# Patient Record
Sex: MALE | Race: WHITE
[De-identification: names, ages, dates, MRNs, and addresses within clinical notes are randomized per-mention and may not be internally consistent; named-entity substitution may affect disease eponyms.]

---

## 2018-06-18 ENCOUNTER — HOSPITAL ENCOUNTER (EMERGENCY)
Dept: HOSPITAL 33 - ED | Age: 68
Discharge: HOME | End: 2018-06-18
Payer: MEDICARE

## 2018-06-18 VITALS — HEART RATE: 70 BPM | SYSTOLIC BLOOD PRESSURE: 160 MMHG | DIASTOLIC BLOOD PRESSURE: 90 MMHG

## 2018-06-18 VITALS — OXYGEN SATURATION: 97 %
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
## 2018-06-18 DIAGNOSIS — M79.631: ICD-10-CM

## 2018-06-18 DIAGNOSIS — S63.501A: Primary | ICD-10-CM

## 2018-06-18 DIAGNOSIS — M79.641: ICD-10-CM

## 2018-06-18 DIAGNOSIS — M25.531: ICD-10-CM

## 2018-06-18 PROCEDURE — 73130 X-RAY EXAM OF HAND: CPT

## 2018-06-18 PROCEDURE — 73090 X-RAY EXAM OF FOREARM: CPT

## 2018-06-18 PROCEDURE — 73110 X-RAY EXAM OF WRIST: CPT

## 2018-06-18 PROCEDURE — 99283 EMERGENCY DEPT VISIT LOW MDM: CPT

## 2018-06-18 NOTE — XRAY
Indication: Pain following fall 4 days ago.



Comparison: None



2 views of the right forearm demonstrates old distal radial/ulnar fractures

with intact hardware and advanced radiocarpal degenerative changes similar

appearance to right hand exam December 19, 2011.  No other bony, articular, or

soft tissue abnormalities.

## 2018-06-18 NOTE — ERPHSYRPT
- History of Present Illness


Time Seen by Provider: 06/18/18 09:24


Source: patient


Exam Limitations: no limitations


Physician History: 





The patient is a 67-year-old right-handed male complaining of falling on 

Thursday or Friday (3-4 days ago), hurting his right wrist and forearm.  The 

wrist and hand are still swollen.  He has had surgery on his right forearm with 

"plates put in".  He denies numbness or tingling.  His past medical history is 

significant for right forearm open reduction internal fixation, CABG, coronary 

artery disease.


Occurred: days ago (4)


Reason for Fall: lost balance, fell from standing pos


Injuries/Pain Location: upper extremity (right forearm, wrist, and hand)


Loss of Consciousness: no loss of consciousness


Quality: aching


Severity of Pain-Max: moderate


Severity of Pain-Current: moderate


Modifying Factors: Improves With: nothing


Associated Symptoms (Fall): extremity injury


Allergies/Adverse Reactions: 








codeine Adverse Reaction (Intermediate, Verified 06/18/18 09:19)


 Vomiting





Home Medications: 








Hydrocodone/APAP 5/325*** [Norco 5/325 mg***]  10/13/14 [History]








- Review of Systems


Constitutional: No Fever, No Chills


Eyes: No Symptoms


Ears, Nose, & Throat: No Symptoms


Respiratory: No Cough, No Dyspnea


Cardiac: No Chest Pain, No Edema, No Syncope


Abdominal/Gastrointestinal: No Abdominal Pain, No Nausea, No Vomiting, No 

Diarrhea


Genitourinary Symptoms: No Dysuria


Musculoskeletal: Fall, Injury, Joint Swelling


Skin: No Rash


Neurological: No Dizziness, No Focal Weakness, No Sensory Changes


Psychological: No Symptoms


Endocrine: No Symptoms


Hematologic/Lymphatic: No Symptoms


Immunological/Allergic: No Symptoms


All Other Systems: Reviewed and Negative





- Past Medical History


Pertinent Past Medical History: Yes


Neurological History: Stroke


ENT History: No Pertinent History


Cardiac History: High Cholesterol, Hypertension, Myocardial Infarction (MI)


Respiratory History: No Pertinent History


Endocrine Medical History: No Pertinent History


Musculoskeletal History: No Pertinent History


GI Medical History: No Pertinent History


 History: No Pertinent History


Psycho-Social History: No Pertinent History


Male Reproductive Disorders: No Pertinent History





- Past Surgical History


Past Surgical History: Yes


Neuro Surgical History: No Pertinent History


Cardiac: CABG


Respiratory: No Pertinent History


Gastrointestinal: Appendectomy


Genitourinary: No Pertinent History


Musculoskeletal: Orthopedic Surgery, Other


Male Surgical History: No Pertinent History





- Social History


Smoking Status: Current every day smoker


How long have you smoked: 40


Exposure to second hand smoke: Yes


Drug Use: none





- Nursing Vital Signs


Nursing Vital Signs: 


 Initial Vital Signs











Temperature  98.6 F   06/18/18 09:08


 


Pulse Rate  68   06/18/18 09:08


 


Respiratory Rate  20   06/18/18 09:08


 


Blood Pressure  176/101   06/18/18 09:08


 


O2 Sat by Pulse Oximetry  97   06/18/18 09:08








 Pain Scale











Pain Intensity                 10

















- Carson Coma Score


Best Eye Response (Carson): (4) open spontaneously


Best Verbal Response (Carson): (5) oriented


Best Motor Response (Swathi): (6) obeys commands


Carson Total: 15





- Physical Exam


General Appearance: no apparent distress, alert


Head Injury: no evidence of injury


Eye Exam: PERRL/EOMI


ENT Exam: airway nml


Neck Exam: normal inspection, No tenderness


Respiratory/Chest Exam: normal breath sounds, No chest tenderness, No 

respiratory distress


Cardiovascular Exam: normal heart sounds, regular rate/rhythm


Gastrointestinal Exam: soft, No tenderness, No distention, No guarding, No 

ecchymosis


Rectal Exam: not done


Back Exam: normal inspection, No vertebral tenderness


Extremity Exam: limited range of motion, pain with movement, swelling, 

tenderness, other (Examination of the right forearm, wrist, and hand shows a 

well healed scar from the open reduction internal fixation of the right 

forearm.  There is limited range of movement of the forearm with supination and 

pronation.  There is moderate swelling of the right wrist and right hand.)


Neurologic Exam: alert, oriented x 3, cooperative, sensation nml, No motor 

deficits


Skin Exam: normal color, warm, dry


**SpO2 Interpretation**: normal


Oxygen Delivery: Room Air





- Radiology Exams


  ** Right Forearm


X-ray Interpretation: Reviewed by me, Teleradiologist Report, No Fracture (per 

Dr Kemp)





  ** Right Wrist


X-ray Interpretation: Reviewed by me, Teleradiologist Report, No Fracture (per 

Dr Kemp.)





  ** Right Hand


X-ray Interpretation: Reviewed by me, Teleradiologist Report, No Fracture (Per 

Dr Kemp.)


Ordered Tests: 


 Active Orders 24 hr











 Category Date Time Status


 


 FOREARM Stat Exams  06/18/18 09:23 Completed


 


 HAND (MINIMUM 3 VIEWS) Stat Exams  06/18/18 09:27 Completed


 


 WRIST (MIN 3 VIEWS) Stat Exams  06/18/18 09:23 Completed














- Progress


Progress: unchanged


Counseled pt/family regarding: need for follow-up, rad results





- Departure


Time of Disposition: 10:21


Departure Disposition: Home


Clinical Impression: 


 Right wrist sprain





Condition: Stable


Critical Care Time: No


Referrals: 


TRIPP ALVARADO [Primary Care Provider] - 


Additional Instructions: 


You have a sprain of your right wrist.  The x-rays of your forearm, wrist, and 

hand were negative broken bones.  Your hardware fixing your forearm from past 

operations is normal in appearance.  Wear the wrist splint to provide comfort.  

Keep your wrist and hand elevated.  Apply ice as needed.  Take Norco one tablet 

every 4-6 hours as needed.  If there is no improvement in 1-2 days, please see 

Dr. Alvarado or your orthopedic surgeon.


Prescriptions: 


Hydrocodone/APAP 5/325*** [Norco 5/325 mg] 1 each PO Q4-6HPRN PRN #10 tablet 

MDD 6


 PRN Reason: Pain

## 2018-06-18 NOTE — XRAY
Indication: Pain following fall 4 days ago.



Comparison: None



3 views of the right wrist demonstrates old distal radial/ulnar fractures with

intact hardware and advanced radiocarpal degenerative changes similar

appearance to right hand exam December 19, 2011.  No other bony, articular, or

soft tissue abnormalities.

## 2018-06-18 NOTE — XRAY
Indication: Pain following fall 4 days ago.



Comparison: December 19, 2011.



3 views of the right hand again demonstrates mild degenerative changes of all

IP joints and 1st metacarpal multangular articulation.  No new/acute bony,

articular, or soft tissue abnormalities.  Right wrist and right forearm

reported separately.

## 2024-10-02 ENCOUNTER — HOSPITAL ENCOUNTER (EMERGENCY)
Dept: HOSPITAL 33 - ED | Age: 74
Discharge: HOME | End: 2024-10-02
Payer: MEDICARE

## 2024-10-02 VITALS — DIASTOLIC BLOOD PRESSURE: 87 MMHG | SYSTOLIC BLOOD PRESSURE: 171 MMHG | HEART RATE: 78 BPM

## 2024-10-02 VITALS — TEMPERATURE: 98.5 F

## 2024-10-02 VITALS — RESPIRATION RATE: 20 BRPM | OXYGEN SATURATION: 95 %

## 2024-10-02 DIAGNOSIS — Z79.899: ICD-10-CM

## 2024-10-02 DIAGNOSIS — I10: Primary | ICD-10-CM

## 2024-10-02 DIAGNOSIS — E78.5: ICD-10-CM

## 2024-10-02 DIAGNOSIS — Z72.0: ICD-10-CM

## 2024-10-02 LAB
ALBUMIN SERPL-MCNC: 4.1 G/DL (ref 3.5–5)
ALP SERPL-CCNC: 80 U/L (ref 38–126)
ALT SERPL-CCNC: 27 U/L (ref 0–50)
ANION GAP SERPL CALC-SCNC: 8.2 MEQ/L (ref 5–15)
AST SERPL QL: 24 U/L (ref 17–59)
BASOPHILS # BLD AUTO: 0.04 X10^3/UL (ref 0.01–0.08)
BASOPHILS NFR BLD AUTO: 0.5 % (ref 0.2–1.2)
BILIRUB BLD-MCNC: 0.4 MG/DL (ref 0.2–1.3)
BUN SERPL-MCNC: 13 MG/DL (ref 9–20)
CALCIUM SPEC-MCNC: 9.4 MG/DL (ref 8.4–10.2)
CHLORIDE SERPL-SCNC: 107 MMOL/L (ref 98–107)
CO2 SERPL-SCNC: 30 MMOL/L (ref 22–30)
CREAT SERPL-MCNC: 0.95 MG/DL (ref 0.66–1.25)
EOSINOPHIL # BLD AUTO: 0.21 X10^3/UL (ref 0.04–0.54)
GFR SERPLBLD BASED ON 1.73 SQ M-ARVRAT: 85 ML/MIN
GLUCOSE SERPL-MCNC: 113 MG/DL (ref 74–106)
HCT VFR BLD AUTO: 44 % (ref 40.1–51)
HGB BLD-MCNC: 14.7 G/DL (ref 13.7–17.5)
IMM GRANULOCYTES # BLD: 0.05 X10^3U/L (ref 0–0.03)
IMM GRANULOCYTES NFR BLD: 0.7 % (ref 0–0.43)
LYMPHOCYTES # SPEC AUTO: 1.53 X10^3/UL (ref 1.32–3.57)
MAGNESIUM SERPL-MCNC: 2 MG/DL (ref 1.6–2.3)
MCH RBC QN AUTO: 30 PG (ref 25.7–32.2)
MCHC RBC AUTO-ENTMCNC: 33.4 G/DL (ref 32.3–36.5)
MONOCYTES # BLD AUTO: 0.54 X10^3/UL (ref 0.3–0.82)
NRBC # BLD AUTO: 0 X10^3U/L (ref 0–0.01)
NRBC BLD AUTO-RTO: 0 % (ref 0–0.2)
PLATELET # BLD AUTO: 225 X10^3/UL (ref 163–337)
POTASSIUM SERPLBLD-SCNC: 4.5 MMOL/L (ref 3.5–5.1)
PROT SERPL-MCNC: 6.6 G/DL (ref 6.3–8.2)
RBC # BLD AUTO: 4.9 X10^6/UL (ref 4.63–6.08)
SODIUM SERPL-SCNC: 140 MMOL/L (ref 135–145)
WBC # BLD AUTO: 7.7 X10^3/UL (ref 4.23–9.07)

## 2024-10-02 PROCEDURE — 94760 N-INVAS EAR/PLS OXIMETRY 1: CPT

## 2024-10-02 PROCEDURE — 93005 ELECTROCARDIOGRAM TRACING: CPT

## 2024-10-02 PROCEDURE — 84484 ASSAY OF TROPONIN QUANT: CPT

## 2024-10-02 PROCEDURE — 99284 EMERGENCY DEPT VISIT MOD MDM: CPT

## 2024-10-02 PROCEDURE — 36415 COLL VENOUS BLD VENIPUNCTURE: CPT

## 2024-10-02 PROCEDURE — 93041 RHYTHM ECG TRACING: CPT

## 2024-10-02 PROCEDURE — 80053 COMPREHEN METABOLIC PANEL: CPT

## 2024-10-02 PROCEDURE — 85025 COMPLETE CBC W/AUTO DIFF WBC: CPT

## 2024-10-02 PROCEDURE — 83735 ASSAY OF MAGNESIUM: CPT

## 2024-10-02 NOTE — ERPHSYRPT
- History of Present Illness


Time Seen by Provider: 10/02/24 14:54


Source: patient, family


Exam Limitations: no limitations


Physician History: 





This is a 73-year-old white male patient who does have a history of hypertension

but did not take his blood pressure medication this morning and was brought to 

the emergency department by private vehicle escorted by his sister in order for 

him to obtain a radiographic study that was ordered for him.  Patient's primary 

care provider is nurse practitioner Codey.  He also does see cardiologist, Dr. Hernández.  It is standard for radiologic services to obtain vital signs before 

procedures or studies being performed.  He had very elevated systolic blood 

pressure in the 200 range.  Upon arrival to the emergency department the patient

systolic blood pressure was 221.  He was very anxious.  The nurse followed up 

with the patient's pharmacy.  The patient has not picked up any blood pressure 

medication since February 2024.  Patient denies headache.  Patient denies chest 

pain.  Patient denies visual changes.  Patient has a history of gout, 

hypertension, hyperlipidemia, CVA, coronary artery disease (CABG)


Timing/Duration: today


Severity: moderate (The value of the systolic blood pressure is elevated.  

Patient is asymptomatic)


Associated Symptoms: denies symptoms


Allergies/Adverse Reactions: 








codeine Adverse Reaction (Intermediate, Verified 10/02/24 15:13)


   Vomiting





Home Medications: 








Rosuvastatin Calcium 10 mg PO DAILY 10/02/24 [History]





Hx Tetanus, Diphtheria Vaccination/Date Given: No


Hx Influenza Vaccination/Date Given: No


Hx Pneumococcal Vaccination/Date Given: No





Travel Risk





- International Travel


Have you traveled outside of the country in past 3 weeks: No





- Emerging Infectious Disease


Are you exhibiting symptoms associated with any current EIDs: No





- Review of Systems


Constitutional: No Symptoms


Eyes: No Symptoms


Ears, Nose, & Throat: No Symptoms


Respiratory: No Symptoms


Cardiac: No Symptoms


Abdominal/Gastrointestinal: No Symptoms


Genitourinary Symptoms: No Symptoms


Musculoskeletal: No Symptoms


Skin: No Symptoms


Neurological: No Symptoms


Psychological: No Symptoms


Endocrine: No Symptoms


Hematologic/Lymphatic: No Symptoms


Immunological/Allergic: No Symptoms


All Other Systems: Reviewed and Negative





- Past Medical History


Pertinent Past Medical History: Yes


Neurological History: Stroke


ENT History: No Pertinent History


Cardiac History: High Cholesterol, Hypertension, Myocardial Infarction (MI)


Respiratory History: No Pertinent History


Endocrine Medical History: No Pertinent History


Musculoskeletal History: No Pertinent History


GI Medical History: No Pertinent History


 History: No Pertinent History


Psycho-Social History: No Pertinent History


Male Reproductive Disorders: No Pertinent History





- Past Surgical History


Past Surgical History: Yes


Neuro Surgical History: No Pertinent History


Cardiac: CABG


Respiratory: No Pertinent History


Gastrointestinal: Appendectomy


Genitourinary: No Pertinent History


Musculoskeletal: Orthopedic Surgery, Other


Male Surgical History: No Pertinent History





- Social History


Smoking Status: Current every day smoker


How long have you smoked: 40


Exposure to second hand smoke: Yes


Drug Use: none


Patient Lives Alone: No





- Nursing Vital Signs


Nursing Vital Signs: 


                               Initial Vital Signs











Temperature  98.5 F   10/02/24 15:01


 


Pulse Rate  83   10/02/24 15:01


 


Blood Pressure  221/108   10/02/24 15:01


 


O2 Sat by Pulse Oximetry  98   10/02/24 15:01








                                   Pain Scale











Pain Intensity                 0

















- Physical Exam


General Appearance: no apparent distress, alert


Eye Exam: PERRL/EOMI, eyes nml inspection


Ears, Nose, Throat Exam: normal ENT inspection, moist mucous membranes


Neck Exam: normal inspection, non-tender, supple, full range of motion


Respiratory Exam: normal breath sounds, lungs clear, airway intact, No chest 

tenderness, No respiratory distress


Cardiovascular Exam: regular rate/rhythm, normal heart sounds, normal peripheral

 pulses


Gastrointestinal/Abdomen Exam: soft, normal bowel sounds, No tenderness


Rectal Exam: not done


Back Exam: normal inspection, normal range of motion, No CVA tenderness, No 

vertebral tenderness


Extremity Exam: normal inspection, normal range of motion, pelvis stable


Neurologic Exam: alert, oriented x 3, cooperative, CNs II-XII nml as tested, 

normal mood/affect, nml cerebellar function, nml station & gait, sensation nml


Skin Exam: normal color, warm, dry


Lymphatic Exam: No adenopathy


**SpO2 Interpretation**: normal


O2 Delivery: Room Air





- Course


Nursing assessment & vital signs reviewed: Yes


EKG Interpreted by Me: RATE (84), Sinus Rhythm, NORMAL AXIS, NORMAL INTERVALS, 

NORMAL QRS, Non-specific ST Changes, Other (No acute ischemic changes on today's

 twelve-lead EKG.  The QTc is 466)


Ordered Tests: 


                               Active Orders 24 hr











 Category Date Time Status


 


 Cardiac Monitor STAT Care  10/02/24 15:20 Active


 


 EKG-ER Only STAT Care  10/02/24 15:19 Active


 


 IV Insertion STAT Care  10/02/24 15:19 Active


 


 Pulse Oximetry (ED) STAT Care  10/02/24 15:19 Active


 


 CBC W DIFF Stat Lab  10/02/24 16:15 Completed


 


 CMP Stat Lab  10/02/24 16:15 Completed


 


 MAGNESIUM Stat Lab  10/02/24 16:15 Completed


 


 TROPONIN Q4H Lab  10/02/24 16:15 Completed


 


 TROPONIN Q4H Lab  10/02/24 19:30 Ordered


 


 TROPONIN Q4H Lab  10/02/24 23:30 Ordered











Lab/Rad Data: 


                           Laboratory Result Diagrams





                                 10/02/24 16:15 





                                 10/02/24 16:15 





                               Laboratory Results











  10/02/24 10/02/24 10/02/24 Range/Units





  16:15 16:15 16:15 


 


WBC    7.7  (4.23-9.07)  x10^3/uL


 


RBC    4.90  (4.63-6.08)  x10^6/uL


 


Hgb    14.7  (13.7-17.5)  g/dL


 


Hct    44.0  (40.1-51.0)  %


 


MCV    89.8  (79.0-92.2)  fL


 


MCH    30.0  (25.7-32.2)  pg


 


MCHC    33.4  (32.3-36.5)  g/dL


 


RDW    12.2  (11.6-14.4)  %


 


Plt Count    225  (163-337)  x10^3/uL


 


MPV    8.6 L  (9.4-12.4)  fL


 


Gran %    69.0 H  (34.0-67.9)  %


 


Immature Gran % (Auto)    0.7 H  (0.001-0.429)  %


 


Nucleat RBC Rel Count    0.0  (0.00-0.2)  %


 


Eos # (Auto)    0.21  (0.04-0.54)  x10^3/uL


 


Immature Gran # (Auto)    0.05 H  (0.001-0.031)  x10^3u/L


 


Absolute Lymphs (auto)    1.53  (1.32-3.57)  x10^3/uL


 


Absolute Monos (auto)    0.54  (0.30-0.82)  x10^3/uL


 


Absolute Nucleated RBC    0.00  (0.00-0.012)  x10^3u/L


 


Lymphocytes %    20.0 L  (21.8-53.1)  %


 


Monocytes %    7.1  (5.3-12.2)  %


 


Eosinophils %    2.7  (0.8-7.0)  %


 


Basophils %    0.5  (0.2-1.2)  %


 


Absolute Granulocytes    5.28  (1.78-5.38)  x10^3/uL


 


Basophils #    0.04  (0.01-0.08)  x10^3/uL


 


Sodium   140   (135-145)  mmol/L


 


Potassium   4.5   (3.5-5.1)  mmol/L


 


Chloride   107   ()  mmol/L


 


Carbon Dioxide   30   (22-30)  mmol/L


 


Anion Gap   8.2   (5-15)  MEQ/L


 


BUN   13   (9-20)  mg/dL


 


Creatinine   0.95   (0.66-1.25)  mg/dL


 


Estimated GFR   85.0   ML/MIN


 


Glucose   113 H   ()  mg/dL


 


Calcium   9.4   (8.4-10.2)  mg/dL


 


Magnesium   2.0   (1.6-2.3)  mg/dL


 


Total Bilirubin   0.40   (0.2-1.3)  mg/dL


 


AST   24   (17-59)  U/L


 


ALT   27   (0-50)  U/L


 


Alkaline Phosphatase   80   ()  U/L


 


Troponin I  0.013    (0.000-0.033)  ng/mL


 


Serum Total Protein   6.6   (6.3-8.2)  g/dL


 


Albumin   4.1   (3.5-5.0)  g/dL














- Progress


Progress: unchanged


Progress Note: 





10/02/24 15:52


My medical decision making and the assignment of moderate complexity to this 

patient's medical issue today is based on review of the patient's past medical h

istory, review of the patient's medication list, reviewed patient drug allergy 

list, history present illness and physical findings on examination.  The workup 

includes placement of intravenous line, twelve-lead EKG, troponin level, CBC, 

CMP levels.  We will also order serial blood pressure levels every 10 minutes.





Differential diagnosis includes but not limited to asymptomatic hypertension, 

noncompliance with blood pressure medications


10/02/24 16:55


I interpreted the laboratory data results.  Based on the laboratory data 

results, the patient does not have any acute, emergent medical findings.





His systolic blood pressure dropped to the value of 160 mmHg without any 

intervention.  Patient will be discharged to home and instructed to call his 

primary care provider tomorrow, 10/3/2024 to discuss management of his chronic 

high blood pressure.  Patient continues to be asymptomatic


10/02/24 16:56





Counseled pt/family regarding: lab results, diagnosis, need for follow-up





Medical Desision Making





- Independent Historian


Additional History obtained from: Family





- Diagnostic Testing


Diagnostic test were ordered, analyzed, and reviewed by me: Yes





- Risk of complications


Low Risk: Low risk of morbidity from additional dx testing or treatment





- Departure


Departure Disposition: Home


Clinical Impression: 


 Hypertension





Condition: Stable


Critical Care Time: No


Referrals: 


BETO HERNÁNDEZ [Primary Care Provider] - Follow up/PCP as directed


Additional Instructions: 


Call your primary prescribing provider tomorrow, 10/3/2024 to make arrangements 

for follow-up appointment to be seen in the next 3 days and for further 

instructions regarding your high blood pressure medication

## 2025-03-10 ENCOUNTER — HOSPITAL ENCOUNTER (EMERGENCY)
Dept: HOSPITAL 33 - ED | Age: 75
Discharge: HOME | End: 2025-03-10
Payer: COMMERCIAL

## 2025-03-10 VITALS
OXYGEN SATURATION: 98 % | SYSTOLIC BLOOD PRESSURE: 188 MMHG | RESPIRATION RATE: 20 BRPM | DIASTOLIC BLOOD PRESSURE: 70 MMHG | HEART RATE: 68 BPM

## 2025-03-10 VITALS — TEMPERATURE: 98.4 F

## 2025-03-10 DIAGNOSIS — Z79.891: ICD-10-CM

## 2025-03-10 DIAGNOSIS — Z79.899: ICD-10-CM

## 2025-03-10 DIAGNOSIS — Z72.0: ICD-10-CM
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
## 2025-03-10 DIAGNOSIS — S50.01XA: Primary | ICD-10-CM

## 2025-03-10 DIAGNOSIS — E78.5: ICD-10-CM

## 2025-03-10 DIAGNOSIS — I10: ICD-10-CM

## 2025-03-10 PROCEDURE — 73080 X-RAY EXAM OF ELBOW: CPT

## 2025-03-10 PROCEDURE — 99283 EMERGENCY DEPT VISIT LOW MDM: CPT

## 2025-03-10 NOTE — ERPHSYRPT
- History of Present Illness


Time Seen by Provider: 03/10/25 12:23


Source: patient, family


Exam Limitations: no limitations


Physician History: 





This is a 74-year-old white male patient who presents to the emergency 

department by private vehicle accompanied by his spouse with right elbow pain.  

Patient fell 2 days ago onto his right elbow.  Patient has a history of gout and

hyperlipidemia.  Although the patient is allergic to codeine, he has taken 

Norco's and Percocets without difficulty or adverse effects per his report


Occurred: days ago (2)


Method of Injury: other (Patient tripped), fell


Quality: constant, aching


Severity of Pain-Max: mild (To moderate)


Severity of Pain-Current: mild


Extremities Pain Location: elbow: right


Modifying Factors: Improves With: movement


Associated Symptoms: none


Allergies/Adverse Reactions: 








codeine Adverse Reaction (Intermediate, Verified 10/02/24 15:13)


   Vomiting





Home Medications: 








Rosuvastatin Calcium 10 mg PO DAILY 10/02/24 [History]





Hx Tetanus, Diphtheria Vaccination/Date Given: No


Hx Influenza Vaccination/Date Given: No


Hx Pneumococcal Vaccination/Date Given: No





Travel Risk





- International Travel


Have you traveled outside of the country in past 3 weeks: No





- Emerging Infectious Disease


Are you exhibiting symptoms associated with any current EIDs: No





- Review of Systems


Constitutional: No Symptoms


Eyes: No Symptoms


Ears, Nose, & Throat: No Symptoms


Respiratory: No Symptoms


Cardiac: No Symptoms


Abdominal/Gastrointestinal: No Symptoms


Genitourinary Symptoms: No Symptoms


Musculoskeletal: Fall (Patient tripped and fell 2 days ago), Injury (Right 

elbow)


Skin: No Symptoms


Neurological: No Symptoms


Psychological: No Symptoms


Endocrine: No Symptoms


Hematologic/Lymphatic: No Symptoms


Immunological/Allergic: No Symptoms


All Other Systems: Reviewed and Negative





- Past Medical History


Pertinent Past Medical History: Yes


Neurological History: Stroke


ENT History: No Pertinent History


Cardiac History: High Cholesterol, Hypertension, Myocardial Infarction (MI)


Respiratory History: No Pertinent History


Endocrine Medical History: No Pertinent History


Musculoskeletal History: No Pertinent History


GI Medical History: No Pertinent History


 History: No Pertinent History


Psycho-Social History: No Pertinent History


Male Reproductive Disorders: No Pertinent History





- Past Surgical History


Past Surgical History: Yes


Neuro Surgical History: No Pertinent History


Cardiac: CABG


Respiratory: No Pertinent History


Gastrointestinal: Appendectomy


Genitourinary: No Pertinent History


Musculoskeletal: Orthopedic Surgery, Other


Male Surgical History: No Pertinent History





- Social History


Smoking Status: Current every day smoker


How long have you smoked: 40


Exposure to second hand smoke: Yes


Drug Use: none


Patient Lives Alone: No





- Social Determinants of Health


Will the patient participate in the screening: Yes


Do you worry about a steady place to live?: No


In the past 12 months,have you had to go without utilities?: No


Transportation Issues: No


Has anyone in your support network made you feel unsafe?: No


Have you or anyone in your house had to go w/o enough food: No





- Nursing Vital Signs


Nursing Vital Signs: 


                               Initial Vital Signs











Temperature  98.4 F   03/10/25 12:20


 


Pulse Rate  63   03/10/25 12:20


 


Respiratory Rate  18   03/10/25 12:20


 


Blood Pressure  202/71   03/10/25 12:20


 


O2 Sat by Pulse Oximetry  97   03/10/25 12:20








                                   Pain Scale











Pain Intensity                 7

















- Physical Exam


General Appearance: no apparent distress, alert


Eyes, Ears, Nose, Throat Exam: normal ENT inspection, moist mucous membranes


Neck Exam: normal inspection, non-tender, supple, full range of motion


Cardiovascular/Respiratory Exam: chest non-tender, no respiratory distress


Abdominal Exam: non-tender


Back Exam: normal inspection, normal range of motion, No CVA tenderness, No 

vertebral tenderness


Shoulder Exam: normal inspection, non-tender, no evidence of injury, normal ROM


Elbow/Forearm Exam: normal ROM, bone tenderness (Right elbow), soft tissue 

tenderness (Right elbow), swelling (Right elbow)


Wrist Exam: normal inspection, non-tender, no evidence of injury, normal ROM


Hand Exam: normal inspection, non-tender, no evidence of injury, normal ROM


Neuro/Tendon Exam: normal sensation, normal motor functions, normal tendon 

functions


Mental Status Exam: alert, oriented x 3, cooperative


Skin Exam: normal color, warm, dry


**SpO2 Interpretation**: normal


O2 Delivery: Room Air





- Course


Nursing assessment & vital signs reviewed: Yes


Ordered Tests: 


                               Active Orders 24 hr











 Category Date Time Status


 


 ELBOW (MINIMUM 3 VIEWS) Stat Exams  03/10/25 12:40 Completed














- Progress


Progress: unchanged, pain not gone completely


Progress Note: 





03/10/25 13:03


My medical decision making and the assignment of low complexity to this 

patient's medical issue today is based on review of the patient's past medical 

history, review of the patient's medication list, review the patient drug 

allergy list, history present illness and physical findings on examination.  The

 workup in this patient includes x-ray of the patient's right elbow.





Differential diagnosis includes but is not limited to contusion right elbow, 

dislocation right elbow, fractured right elbow, right elbow sprain





The x-ray of the right elbow was interpreted by the radiologist and I reviewed 

the impression.  The impression states mild degenerative changes.  No new or 

acute bony fracture or dislocation seen.


Counseled pt/family regarding: diagnosis, need for follow-up, rad results





Medical Desision Making





- Independent Historian


Additional History obtained from: Spouse





- Diagnostic Testing


Diagnostic test were ordered, analyzed, and reviewed by me: Yes


Radiological Interpretation: Reviewed by me, Teleradiologist Report





- Risk of complications


The pt has a mod risk of morbidity or mortality based on: Need for prescription 

drug management





- Departure


Departure Disposition: Home


Clinical Impression: 


 Contusion of right elbow





Condition: Stable


Critical Care Time: No


Referrals: 


BETO HERNÁNDEZ [Primary Care Provider] - Follow up/PCP as directed


Additional Instructions: 


May add ibuprofen 600 mg orally with food 3 times a day for the next 5 days if 

there are no contraindications.  Take your medications as prescribed.  Wear the 

right arm sling for comfort.  Follow-up with your primary care provider or the 

[facility] orthopedic clinic.  The clinic is open Monday through 

Friday 8 AM to 10 AM.  It is a walk-in clinic and you do not need to have an 

appointment.


Prescriptions: 


Hydrocodone/APAP 5/325 [Norco 5/325 mg***] 1 each PO Q12H PRN PRN #6 tablet MDD 

3


 PRN Reason: Pain

## 2025-03-10 NOTE — XRAY
Indication: Pain following fall.



Comparison: August 2, 2016



3 view right elbow again demonstrates mild degenerative changes and partially

visualized distal ulna/radial orthopedic hardware.  No new/acute bony,

articular, or soft tissue abnormalities.